# Patient Record
Sex: MALE | Race: WHITE
[De-identification: names, ages, dates, MRNs, and addresses within clinical notes are randomized per-mention and may not be internally consistent; named-entity substitution may affect disease eponyms.]

---

## 2018-03-07 ENCOUNTER — HOSPITAL ENCOUNTER (EMERGENCY)
Dept: HOSPITAL 80 - FED | Age: 23
Discharge: HOME | End: 2018-03-07
Payer: COMMERCIAL

## 2018-03-07 VITALS — DIASTOLIC BLOOD PRESSURE: 85 MMHG | OXYGEN SATURATION: 97 % | HEART RATE: 82 BPM | SYSTOLIC BLOOD PRESSURE: 129 MMHG

## 2018-03-07 VITALS — TEMPERATURE: 98.2 F | RESPIRATION RATE: 16 BRPM

## 2018-03-07 DIAGNOSIS — R56.9: Primary | ICD-10-CM

## 2018-03-07 LAB — PLATELET # BLD: 332 10^3/UL (ref 150–400)

## 2018-03-07 NOTE — EDPHY
H & P


Source: Patient, EMS


Time Seen by Provider: 03/07/18 06:40


HPI/ROS: 





HPI





CHIEF COMPLAINT:  Seizure





HISTORY OF PRESENT ILLNESS:  Patient very pleasant 22-year-old male, otherwise 

healthy does at the history depression and anxiety, no history of seizures, he 

presents emergency room after his girlfriend called 911 for him having a 1 min 

what is described as generalized tonic-clonic seizure with postictal state 

discovered by EMS.  Patient was postictal according to EMS.  He has since 

cleared during transport to the emergency room and is alert and orient x4 upon 

arrival to the emergency room.  He has never had a seizure before.  No change 

in his medications.  He takes Wellbutrin.  He does smoke marijuana regularly 

and did smoke marijuana last night but denies any other alcohol or illicit 

drugs.


Denies recent illness.  Denies headache or stiff neck or fever.











Past Medical History:  Depression, anxiety





Past Surgical History:  No recent surgery





Social History:  Daily marijuana use.  Denies illicit drugs or alcohol.





Family History:  Noncontributory








ROS   


REVIEW OF SYSTEMS:


A comprehensive 10 point review of systems is otherwise negative aside from 

elements mentioned in the history of present illness.








Exam   


Constitutional appears well nontoxic  triage nursing summary reviewed, vital 

signs reviewed, awake/alert. 


Eyes   normal conjunctivae and sclera, EOMI, PERRLA. 


HENT   normal inspection, atraumatic, moist mucus membranes, no epistaxis, neck 

supple/ no meningismus, no raccoon eyes. 


Respiratory   clear to auscultation bilaterally, normal breath sounds, no 

respiratory distress, no wheezing. 


Cardiovascular   rate normal, regular rhythm, no murmur, no edema, distal 

pulses normal. 


Gastrointestinal   soft, non-tender, no rebound, no guarding, normal bowel 

sounds, no distension, no pulsatile mass. 


Genitourinary   no CVA tenderness. 


Musculoskeletal  no midline vertebral tenderness, full range of motion, no calf 

swelling, no tenderness of extremities, no meningismus, good pulses, 

neurovascularly intact.


Skin   pink, warm, & dry, no rash, skin atraumatic. 


Neurologic  nonfocal normal neurological exam, awake, alert and oriented x 3, 

AAOx3, moves all 4 extremities equally, motor intact, sensory intact, CN II-XII 

intact, normal cerebellar, normal vision, normal speech. 


Psychiatric   normal mood/affect. 


Heme/Lymph/Immune   no lymphadenopathy.





Differential Diagnosis:  Includes but is not limited to in a particular order 

first-time seizure, intracranial bleed, brain tumor, electrolyte disturbance, 

drug intoxication





Medical Decision Making:  Plan for this patient CT scan head without contrast 

first-time seizure workup.  Check urine drug screen, check basic blood work.  

EKG.





Re-evaluation:








EKG interpretation by me on record in [employer] system.  Impression time of 

EKG 6:30 a.m., this is sinus rhythm 74. No signs of cardiac arrhythmia or acute 

ischemia.  Unremarkable EKG.  No WPW or Brugada.  No prolonged intervals.








0635:  Girlfriend at bedside now report that he had a Generalized tonic-clonic 

seizure lasting less than a minute.  With a postictal state.








0701:  Signed over to Dr. Carpio 7am. Pending CT.  (Wilfred Phoenix)


Constitutional: 


 Initial Vital Signs











Temperature (C)  36.8 C   03/07/18 06:16


 


Heart Rate  96   03/07/18 06:16


 


Respiratory Rate  16   03/07/18 06:16


 


Blood Pressure  101/80   03/07/18 06:16


 


O2 Sat (%)  95   03/07/18 06:16








 











O2 Delivery Mode               Room Air














Allergies/Adverse Reactions: 


 





peanut Allergy (Verified 03/07/18 06:21)


 











Medical Decision Making





- Diagnostics


Imaging Results: 


 Imaging Impressions





Head CT  03/07/18 06:19


Impression:   Normal noncontrast CT of the brain. 


 


The study was performed as an emergency on-call case and discussed by telephone 

with Dr. Ana Carpio at 7:00 AM hrs. The final interpretation is concordant 

with the original communication.  











ED Course/Re-evaluation: 





7:00 a.m.-I assumed care of this patient at shift change.  He presents with a 

new onset generalized seizure. CT scan of the brain read by Dr. Nitin Francisco 

is normal.  CT scan results discussed with the patient.  Seizure precautions 

given, including no driving until cleared by a neurologist.  He will call 

Neurology this morning to make an appointment. (Ana Carpio)


Differential Diagnosis: 





Differential diagnosis includes though it is not limited to status epilepticus, 

hypoglycemia, intracranial hemorrhage, CVA, benzodiazepine withdrawal, alcohol 

withdrawal, epilepsy.


 (Ana Carpio)





- Data Points


Laboratory Results: 


 Laboratory Results





 03/07/18 06:20 





 03/07/18 06:20 





 











  03/07/18 03/07/18 03/07/18





  06:30 06:20 06:20


 


WBC      7.26 10^3/uL 10^3/uL





     (3.80-9.50) 


 


RBC      5.82 10^6/uL 10^6/uL





     (4.40-6.38) 


 


Hgb      16.9 g/dL g/dL





     (13.7-17.5) 


 


Hct      51.8 % H %





     (40.0-51.0) 


 


MCV      89.0 fL fL





     (81.5-99.8) 


 


MCH      29.0 pg pg





     (27.9-34.1) 


 


MCHC      32.6 g/dL g/dL





     (32.4-36.7) 


 


RDW      12.4 % %





     (11.5-15.2) 


 


Plt Count      332 10^3/uL 10^3/uL





     (150-400) 


 


MPV      9.8 fL fL





     (8.7-11.7) 


 


Neut % (Auto)      38.8 % L %





     (39.3-74.2) 


 


Lymph % (Auto)      43.9 % %





     (15.0-45.0) 


 


Mono % (Auto)      9.4 % %





     (4.5-13.0) 


 


Eos % (Auto)      6.6 % %





     (0.6-7.6) 


 


Baso % (Auto)      1.0 % %





     (0.3-1.7) 


 


Nucleat RBC Rel Count      0.0 % %





     (0.0-0.2) 


 


Absolute Neuts (auto)      2.82 10^3/uL 10^3/uL





     (1.70-6.50) 


 


Absolute Lymphs (auto)      3.19 10^3/uL H 10^3/uL





     (1.00-3.00) 


 


Absolute Monos (auto)      0.68 10^3/uL 10^3/uL





     (0.30-0.80) 


 


Absolute Eos (auto)      0.48 10^3/uL H 10^3/uL





     (0.03-0.40) 


 


Absolute Basos (auto)      0.07 10^3/uL 10^3/uL





     (0.02-0.10) 


 


Absolute Nucleated RBC      0.00 10^3/uL 10^3/uL





     (0-0.01) 


 


Immature Gran %      0.3 % %





     (0.0-1.1) 


 


Immature Gran #      0.02 10^3/uL 10^3/uL





     (0.00-0.10) 


 


Sodium    147 mEq/L H mEq/L  





    (135-145)  


 


Potassium    4.6 mEq/L mEq/L  





    (3.5-5.2)  


 


Chloride    106 mEq/L mEq/L  





    ()  


 


Carbon Dioxide    15 mEq/l L mEq/l  





    (22-31)  


 


Anion Gap    26 mEq/L H mEq/L  





    (8-16)  


 


BUN    10 mg/dL mg/dL  





    (7-23)  


 


Creatinine    0.9 mg/dL mg/dL  





    (0.7-1.3)  


 


Estimated GFR    > 60   





    


 


Glucose    115 mg/dL H mg/dL  





    ()  


 


Calcium    10.1 mg/dL mg/dL  





    (8.5-10.4)  


 


Urine Color  YELLOW     





    


 


Urine Appearance  CLEAR     





    


 


Urine pH  5.0     





   (5.0-7.5)   


 


Ur Specific Gravity  1.013     





   (1.002-1.030)   


 


Urine Protein  NEGATIVE     





   (NEGATIVE)   


 


Urine Ketones  NEGATIVE     





   (NEGATIVE)   


 


Urine Blood  NEGATIVE     





   (NEGATIVE)   


 


Urine Nitrate  NEGATIVE     





   (NEGATIVE)   


 


Urine Bilirubin  NEGATIVE     





   (NEGATIVE)   


 


Urine Urobilinogen  NEGATIVE EU EU    





   (0.2-1.0)   


 


Ur Leukocyte Esterase  NEGATIVE     





   (NEGATIVE)   


 


Urine Glucose  NEGATIVE     





   (NEGATIVE)   


 


Urine Opiates Screen  NEGATIVE     





   (NEGATIVE)   


 


Urine Barbiturates  NEGATIVE     





   (NEGATIVE)   


 


Ur Phencyclidine Scrn  NEGATIVE     





   (NEGATIVE)   


 


Ur Amphetamine Screen  NEGATIVE     





   (NEGATIVE)   


 


U Benzodiazepines Scrn  NEGATIVE     





   (NEGATIVE)   


 


Urine Cocaine Screen  NEGATIVE     





   (NEGATIVE)   


 


U Marijuana (THC) Screen  NEGATIVE     





   (NEGATIVE)   











Medications Given: 


 








Discontinued Medications





Sodium Chloride (Ns)  1,000 mls @ 0 mls/hr IV EDNOW ONE; Wide Open


   PRN Reason: Protocol


   Stop: 03/07/18 06:19


   Last Admin: 03/07/18 06:31 Dose:  1,000 mls








Departure





- Departure


Disposition: Home, Routine, Self-Care


Clinical Impression: 


 Seizure





Condition: Good


Instructions:  New-Onset Seizure in Adults (ED)


Additional Instructions: 





1. No driving until you are cleared by a neurologist to drive.


2. No dangerous activities such as riding a ski lift, swimming in a pool or 

other behavior that could put you or someone else at risk in the event of a 

recurrent seizure.  You will need to be cleared by a neurologist to resume 

these activities.


3. Please return to the ED for recurrent seizure, headache, numbness, weakness, 

altered mental status or other concerns.


4. Please call the referral neurologist promptly to schedule a follow-up 

appointment.


Referrals: 


Brandon Conde MD [Medical Doctor] - As per Instructions

## 2018-03-07 NOTE — CPEKG
Heart Rate: 74

RR Interval: 811

P-R Interval: 120

QRSD Interval: 100

QT Interval: 372

QTC Interval: 413

P Axis: 73

QRS Axis: 82

T Wave Axis: 59

EKG Severity - NORMAL ECG -

EKG Impression: SINUS RHYTHM

Electronically Signed By: Augusto Chaney 07-Mar-2018 09:25:17

## 2018-06-12 ENCOUNTER — HOSPITAL ENCOUNTER (EMERGENCY)
Dept: HOSPITAL 80 - FED | Age: 23
Discharge: HOME | End: 2018-06-12
Payer: COMMERCIAL

## 2018-06-12 DIAGNOSIS — G40.909: Primary | ICD-10-CM

## 2018-06-13 VITALS — DIASTOLIC BLOOD PRESSURE: 64 MMHG | SYSTOLIC BLOOD PRESSURE: 107 MMHG

## 2018-06-13 NOTE — EDPHY
H & P


Stated Complaint: pt to ed via ems, seizure with hx of same


Time Seen by Provider: 06/12/18 03:00


HPI/ROS: 





HPI





CHIEF COMPLAINT:  Seizure


HISTORY OF PRESENT ILLNESS:  Patient is a 22-year-old male, presents emergency 

room after he had a generalized tonic-clonic seizure.  This was in bed.  At 

last 1-2 minutes according to his significant other.  Postictal state.  He is 

brought to the emergency room and states he feels nauseous but denies any other 

complaints.  Alert and oriented or more lucid.  He does have a history of a 

seizure most recently had a seizure a few weeks ago.  This was his 1st time 

having seizure.  This is currently his 2nd seizure.





Past Medical History:  Previous seizure





Past Surgical History:  No surgical history





Social History smokes marijuana.  Denies alcohol tobacco or drugs.





Family History:  Noncontributory








ROS   


REVIEW OF SYSTEMS:


A comprehensive 10 point review of systems is otherwise negative aside from 

elements mentioned in the history of present illness.








Exam   


Constitutional   triage nursing summary reviewed, vital signs reviewed, awake/

alert. 


Eyes   normal conjunctivae and sclera, EOMI, PERRLA. 


HENT   normal inspection, atraumatic, moist mucus membranes, no epistaxis, neck 

supple/ no meningismus, no raccoon eyes. 


Respiratory   clear to auscultation bilaterally, normal breath sounds, no 

respiratory distress, no wheezing. 


Cardiovascular   rate normal, regular rhythm, no murmur, no edema, distal 

pulses normal. 


Gastrointestinal   soft, non-tender, no rebound, no guarding, normal bowel 

sounds, no distension, no pulsatile mass. 


Genitourinary   no CVA tenderness. 


Musculoskeletal  no midline vertebral tenderness, full range of motion, no calf 

swelling, no tenderness of extremities, no meningismus, good pulses, 

neurovascularly intact.


Skin   pink, warm, & dry, no rash, skin atraumatic. 


Neurologic   awake, alert and oriented x 3, AAOx3, moves all 4 extremities 

equally, motor intact, sensory intact, CN II-XII intact, normal cerebellar, 

normal vision, normal speech. 


Psychiatric   normal mood/affect. 


Heme/Lymph/Immune   no lymphadenopathy.





Differential Diagnosis:  Includes but is not limited to in a particular order 

epilepsy, electrolyte disturbance, acute seizure, generalized tonic-clonic 

seizure, dehydration, marijuana abuse





Medical Decision Making:  Plan for this patient IV establishment blood draw, 

check electrolytes, CT scan head without contrast, 1 g of Keppra, 1 L normal 

saline, IV Zofran.  Re-evaluate.





Re-evaluation:


CT scan head without contrast negative for acute abnormality. Called to me by 

Dr. Swann. 





Patient's blood work is reviewed.  He did have a low bicarb indicating seizure.

  He has not had any seizure here in emergency room.





Patient will need to follow up with Neurology.  This is his 2nd seizure.  I 

have also ordered oral Keppra.  He has received 1 g of IV Keppra here in 

emergency room.





Given that this is his 2nd seizure needs follow-up with Neurology.





He does state that he saw Neurology 3 months ago.  And had a full seizure 

workup including MRI and CT head with and without contrast these were 

unremarkable he was not started on seizure medications as this was a first-time 

seizure.





He did see Dr. Conde previously.  I recommend he follows back up with him.





Additionally I spoke with him he understands he cannot drive until he is 

cleared by Neurology.





Return precautions discussed with me understands return emergency room if 

develops any worsening symptoms including recurrent seizure activity.


Source: Patient





- Medical/Surgical History


Hx Asthma: No


Hx Chronic Respiratory Disease: No


Hx Diabetes: No


Hx Cardiac Disease: No


Hx Renal Disease: No


Hx Cirrhosis: No


Hx Alcoholism: No


Hx HIV/AIDS: No


Hx Splenectomy or Spleen Trauma: No


Other PMH: depression anxiety, sz





- Social History


Smoking Status: Never smoked


Constitutional: 





 Initial Vital Signs











Temperature (C)  36.7 C   06/12/18 01:50


 


Heart Rate  104 H  06/12/18 01:50


 


Respiratory Rate  18   06/12/18 01:50


 


Blood Pressure  124/63 H  06/12/18 01:50


 


O2 Sat (%)  96   06/12/18 01:50








 











O2 Delivery Mode               Room Air














Allergies/Adverse Reactions: 


 





peanut Allergy (Verified 03/07/18 06:21)


 











Departure





- Departure


Disposition: Home, Routine, Self-Care


Clinical Impression: 


 Seizure





Condition: Good


Instructions:  Epilepsy (ED)


Referrals: 


NONE *PRIMARY CARE P,. [Primary Care Provider] - As per Instructions


Brandon Conde MD [Medical Doctor] - As per Instructions

## 2018-06-13 NOTE — EDPHY
[facility]  

  

Patient Name:  SRI GAY Rpt#:  [id number]  

Account Number:  [id number] Unit Number:  [id number]  

ER Physician:  Wilfred Phoenix MD Patient Type:  REG ER  

Adm Date/Source:  06/12/18 EMR Discharge Date:    

Primary Carrier:  [employer]  

___________________________________________________________________________________  

                  EMERGENCY DEPARTMENT PROVIDER REPORT  

  

  

H   P  

Time Seen by Provider: 06/12/18 07:22  

HPI/ROS:   

  

HPI  

  

CHIEF COMPLAINT:  Seizure  

HISTORY OF PRESENT ILLNESS:  Patient is a 22-year-old male, presents emergency room after he had a 

generalized tonic-clonic seizure.  This was in bed.  At last 1-2 minutes according to his 

significant other.  Postictal state.  He is brought to the emergency room and states he feels 

nauseous but denies any other complaints.  Alert and oriented or more lucid.  He does have a 

history of a seizure most recently had a seizure a few weeks ago.  This was his 1st time having 

seizure.  This is currently his 2nd seizure.  

  

Past Medical History:  Previous seizure  

  

Past Surgical History:  No surgical history  

  

Social History smokes marijuana.  Denies alcohol tobacco or drugs.  

  

Family History:  Noncontributory  

  

  

ROS     

REVIEW OF SYSTEMS:  

A comprehensive 10 point review of systems is otherwise negative aside from elements mentioned in 

the history of present illness.  

  

  

Exam     

Constitutional   triage nursing summary reviewed, vital signs reviewed, awake/alert.   

Eyes   normal conjunctivae and sclera, EOMI, PERRLA.   

HENT   normal inspection, atraumatic, moist mucus membranes, no epistaxis, neck supple/ no 

meningismus, no raccoon eyes.   

Respiratory   clear to auscultation bilaterally, normal breath sounds, no respiratory distress, no 

wheezing.   

Cardiovascular   rate normal, regular rhythm, no murmur, no edema, distal pulses normal.   

Gastrointestinal   soft, non-tender, no rebound, no guarding, normal bowel sounds, no distension, 

no pulsatile mass.   

Genitourinary   no CVA tenderness.   

Musculoskeletal  no midline vertebral tenderness, full range of motion, no calf swelling, no 

tenderness of extremities, no meningismus, good pulses, neurovascularly intact.  

Skin   pink, warm,   dry, no rash, skin atraumatic.   

Neurologic   awake, alert and oriented x 3, AAOx3, moves all 4 extremities equally, motor intact, 

sensory intact, CN II-XII intact, normal cerebellar, normal vision, normal speech.   

Psychiatric   normal mood/affect.   

Heme/Lymph/Immune   no lymphadenopathy.  

  

Differential Diagnosis:  Includes but is not limited to in a particular order epilepsy, electrolyte 

disturbance, acute seizure, generalized tonic-clonic seizure, dehydration, marijuana abuse  

  

Medical Decision Making:  Plan for this patient IV establishment blood draw, check electrolytes, CT 

scan head without contrast, 1 g of Keppra, 1 L normal saline, IV Zofran.  Re-evaluate.  

  

Re-evaluation:  

CT scan head without contrast negative for acute abnormality. Called to me by Dr. Swann.   

  

Patient's blood work is reviewed.  He did have a low bicarb indicating seizure.  He has not had any 

seizure here in emergency room.  

  

Patient will need to follow up with Neurology.  This is his 2nd seizure.  I have also ordered oral 

Keppra.  He has received 1 g of IV Keppra here in emergency room.  

  

Given that this is his 2nd seizure needs follow-up with Neurology.  

  

He does state that he saw Neurology 3 months ago.  And had a full seizure workup including MRI and 

CT head with and without contrast these were unremarkable he was not started on seizure medications 

as this was a first-time seizure.  

  

He did see Dr. Conde previously.  I recommend he follows back up with him.  

  

Additionally I spoke with him he understands he cannot drive until he is cleared by Neurology.  

  

Return precautions discussed with me understands return emergency room if develops any worsening 

symptoms including recurrent seizure activity.  

  

  

Source: Patient  

  

- Medical/Surgical History  

Hx Asthma: No  

Hx Chronic Respiratory Disease: No  

Hx Diabetes: No  

Hx Cardiac Disease: No  

Hx Renal Disease: No  

Hx Cirrhosis: No  

Hx Alcoholism: No  

Hx HIV/AIDS: No  

Hx Splenectomy or Spleen Trauma: No  

Other PMH: depression anxiety  

  

- Social History  

Smoking Status: Never smoked  

Allergies/Adverse Reactions:   

   

  

peanut Allergy (Verified 03/07/18 06:21)  

   

  

  

  

Medical Decision Making  

  

- Data Points  

Laboratory Results:   

   

  

  

  

  06/12/18 06/12/18 06/12/18  

  

  02:40 02:40 02:12  

   

Sodium      Pending   

  

      

   

Potassium      Pending   

  

      

   

Chloride      Pending   

  

      

   

Carbon Dioxide      Pending   

  

      

   

Anion Gap      Pending   

  

      

   

BUN      Pending   

  

      

   

Creatinine      Pending   

  

      

   

Estimated GFR      Pending   

  

      

   

Glucose      Pending   

  

      

   

Calcium      Pending   

  

      

   

Urine Color    YELLOW     

  

      

   

Urine Appearance    CLEAR     

  

      

   

Urine pH    6.0     

  

    (5.0-7.5)    

   

Ur Specific Gravity    1.015     

  

    (1.002-1.030)    

   

Urine Protein    NEGATIVE     

  

    (NEGATIVE)    

   

Urine Ketones    NEGATIVE     

  

    (NEGATIVE)    

   

Urine Blood    NEGATIVE     

  

    (NEGATIVE)    

   

Urine Nitrate    NEGATIVE     

  

    (NEGATIVE)    

   

Urine Bilirubin    NEGATIVE     

  

    (NEGATIVE)    

   

Urine Urobilinogen    NEGATIVE EU EU    

  

    (0.2-1.0)    

   

Ur Leukocyte Esterase    NEGATIVE     

  

    (NEGATIVE)    

   

Urine Glucose    NEGATIVE     

  

    (NEGATIVE)    

   

Urine Opiates Screen  Pending       

  

      

   

Urine Barbiturates  Pending       

  

      

   

Ur Phencyclidine Scrn  Pending       

  

      

   

Ur Amphetamine Screen  Pending       

  

      

   

U Benzodiazepines Scrn  Pending       

  

      

   

Urine Cocaine Screen  Pending       

  

      

   

U Marijuana (THC) Screen  Pending       

  

      

  

  

  

  

Departure  

  

- Departure  

Disposition: Home, Routine, Self-Care  

Clinical Impression:   

 Seizure  

  

Condition: Good  

Instructions:  Epilepsy (ED)  

Additional Instructions:   

1. Do not drive into your cleared by Neurology  

2. Follow-up with Neurology.  Call their for follow-up appointment.  

Referrals:   

NONE *PRIMARY CARE P,. [Primary Care Provider] - As per Instructions  

Brandon Conde MD [Medical Doctor] - As per Instructions  

  

  

*This report may have been compiled using a voice recognition system, and might contain 

typographical errors and blanks.*  

  

Wilfred Phoenix MD  

  

  

  

  

  

  

  

06/12/18 0748 <Electronically signed by Wilfred Phoenix MD>  

   

   

   

  

D: 06/12/18 0738  T: [place]  06/12/18 0738  

CC: NONE *PRIMARY CARE PHYS ONLY*